# Patient Record
Sex: FEMALE | Race: OTHER | NOT HISPANIC OR LATINO | URBAN - METROPOLITAN AREA
[De-identification: names, ages, dates, MRNs, and addresses within clinical notes are randomized per-mention and may not be internally consistent; named-entity substitution may affect disease eponyms.]

---

## 2019-12-03 ENCOUNTER — EMERGENCY (EMERGENCY)
Facility: HOSPITAL | Age: 24
LOS: 1 days | Discharge: HOME | End: 2019-12-03
Attending: EMERGENCY MEDICINE | Admitting: EMERGENCY MEDICINE
Payer: OTHER MISCELLANEOUS

## 2019-12-03 VITALS
DIASTOLIC BLOOD PRESSURE: 75 MMHG | HEIGHT: 63 IN | WEIGHT: 130.07 LBS | RESPIRATION RATE: 16 BRPM | HEART RATE: 70 BPM | OXYGEN SATURATION: 97 % | TEMPERATURE: 98 F | SYSTOLIC BLOOD PRESSURE: 113 MMHG

## 2019-12-03 PROCEDURE — 12001 RPR S/N/AX/GEN/TRNK 2.5CM/<: CPT

## 2019-12-03 PROCEDURE — 99283 EMERGENCY DEPT VISIT LOW MDM: CPT | Mod: 25

## 2019-12-03 RX ORDER — IBUPROFEN 200 MG
600 TABLET ORAL ONCE
Refills: 0 | Status: COMPLETED | OUTPATIENT
Start: 2019-12-03 | End: 2019-12-03

## 2019-12-03 RX ORDER — TETANUS TOXOID, REDUCED DIPHTHERIA TOXOID AND ACELLULAR PERTUSSIS VACCINE, ADSORBED 5; 2.5; 8; 8; 2.5 [IU]/.5ML; [IU]/.5ML; UG/.5ML; UG/.5ML; UG/.5ML
0.5 SUSPENSION INTRAMUSCULAR ONCE
Refills: 0 | Status: COMPLETED | OUTPATIENT
Start: 2019-12-03 | End: 2019-12-03

## 2019-12-03 RX ADMIN — TETANUS TOXOID, REDUCED DIPHTHERIA TOXOID AND ACELLULAR PERTUSSIS VACCINE, ADSORBED 0.5 MILLILITER(S): 5; 2.5; 8; 8; 2.5 SUSPENSION INTRAMUSCULAR at 21:21

## 2019-12-03 RX ADMIN — Medication 600 MILLIGRAM(S): at 21:21

## 2019-12-03 NOTE — ED ADULT NURSE NOTE - CHPI ED NUR SYMPTOMS NEG
no rectal pain/no fever/no chills/no blood in mucus/no vomiting/no purulent drainage/no redness/no bleeding at site/no pain/no drainage

## 2019-12-03 NOTE — ED PROVIDER NOTE - PHYSICAL EXAMINATION
Physical Exam  GEN: Awake, alert, non-toxic appearing,   EYES: full EOMI,  ENT: External inspection normal, normal voice,   HEAD: atraumatic  NECK: FROM neck, supple, no meningismus, trachea midline, no JVD  RESP: no tachypnea, no hypoxia, no resp distress,  MSK: FROM of R 2nd digit w/ isolation  SKIN: 1.7cm laceration to volar aspect of R 2nd digit, over proximal phalanx, no fb noted, no tendon/bony exposure, cap refill < 2 sec, sensation intact

## 2019-12-03 NOTE — ED PROVIDER NOTE - CLINICAL SUMMARY MEDICAL DECISION MAKING FREE TEXT BOX
Yes will update tdap, low suspicion for fx, no fb noted, full ROM noted, no obvious tendon exposure noted

## 2019-12-03 NOTE — ED ADULT NURSE NOTE - INTEGUMENTARY WDL
Color consistent with ethnicity/race, warm, dry intact, resilient. laceration to right index finger s/p cutting herself on broken glass at work.

## 2019-12-03 NOTE — ED PROVIDER NOTE - OBJECTIVE STATEMENT
24 yof pw right index finger laceration, cut open against glass bottle, unclear last tdap.  no other complaints.

## 2019-12-03 NOTE — ED PROVIDER NOTE - PATIENT PORTAL LINK FT
You can access the FollowMyHealth Patient Portal offered by [facility] by registering at the following website: [email or web address]. By joining [employer]’s FollowMyHealth portal, you will also be able to view your health information using other applications (apps) compatible with our system.

## 2019-12-03 NOTE — ED PROVIDER NOTE - CARE PLAN
Principal Discharge DX:	Finger laceration, initial encounter  Secondary Diagnosis:	Tetanus toxoid vaccination administered at current visit

## 2019-12-03 NOTE — ED PROVIDER NOTE - NSFOLLOWUPINSTRUCTIONS_ED_ALL_ED_FT
Follow up with your primary care doctor  We gave you a tetanus vaccination today.  Please keep a record of it  12/3/2019  Return in 7-10 days for suture removal  Take antibiotics for 7 days  Keep the wound dry and clean for 2 days  Then you can gently clean with soap and water  Apply bacitracin after cleaning  Return immediately for any new or worsening symptoms or any new concerns

## 2019-12-03 NOTE — ED ADULT NURSE NOTE - CHIEF COMPLAINT
The patient is a 24y Female complaining of laceration to right index finger s/p cutting herself with glass at work.
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
## 2019-12-09 DIAGNOSIS — Z23 ENCOUNTER FOR IMMUNIZATION: ICD-10-CM
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
## 2019-12-09 DIAGNOSIS — Z88.0 ALLERGY STATUS TO PENICILLIN: ICD-10-CM

## 2019-12-09 DIAGNOSIS — S61.210A LACERATION WITHOUT FOREIGN BODY OF RIGHT INDEX FINGER WITHOUT DAMAGE TO NAIL, INITIAL ENCOUNTER: ICD-10-CM
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
## 2019-12-13 ENCOUNTER — EMERGENCY (EMERGENCY)
Facility: HOSPITAL | Age: 24
LOS: 1 days | Discharge: HOME | End: 2019-12-13
Attending: EMERGENCY MEDICINE | Admitting: EMERGENCY MEDICINE

## 2019-12-13 VITALS
WEIGHT: 139.99 LBS | RESPIRATION RATE: 18 BRPM | DIASTOLIC BLOOD PRESSURE: 67 MMHG | HEIGHT: 63 IN | SYSTOLIC BLOOD PRESSURE: 124 MMHG | TEMPERATURE: 98 F | HEART RATE: 74 BPM | OXYGEN SATURATION: 98 %

## 2019-12-13 NOTE — ED PROVIDER NOTE - NSFOLLOWUPINSTRUCTIONS_ED_ALL_ED_FT
[facility]    Suture Removal, Care After  This sheet gives you information about how to care for yourself after your procedure. Your health care provider may also give you more specific instructions. If you have problems or questions, contact your health care provider.  What can I expect after the procedure?  After your stitches (sutures) are removed, it is common to have:  Some discomfort and swelling in the area.Slight redness in the area.Follow these instructions at home:  If you have a bandage:     Wash your hands with soap and water before you change your bandage (dressing). If soap and water are not available, use hand .Change your dressing as told by your health care provider. If your dressing becomes wet or dirty, or develops a bad smell, change it as soon as possible.If your dressing sticks to your skin, soak it in warm water to loosen it.Wound care        Check your wound every day for signs of infection. Check for:  More redness, swelling, or pain.Fluid or blood.Warmth.Pus or a bad smell.Wash your hands with soap and water before and after touching your wound.Apply cream or ointment only as directed by your health care provider. If you are using cream or ointment, wash the area with soap and water 2 times a day to remove all the cream or ointment. Rinse off the soap and pat the area dry with a clean towel.If you have skin glue or adhesive strips on your wound, leave these closures in place. They may need to stay in place for 2 weeks or longer. If adhesive strip edges start to loosen and curl up, you may trim the loose edges. Do not remove adhesive strips completely unless your health care provider tells you to do that.Keep the wound area dry and clean. Do not take baths, swim, or use a hot tub until your health care provider approves.Continue to protect the wound from injury.Do not pick at your wound. Picking can cause an infection.When your wound has completely healed, wear sunscreen over it or cover it with clothing when you are outside. New scars get sunburned easily, which can make scarring worse.General instructions     Take over-the-counter and prescription medicines only as told by your health care provider.Keep all follow-up visits as told by your health care provider. This is important.Contact a health care provider if:  You have redness, swelling, or pain around your wound.You have fluid or blood coming from your wound.Your wound feels warm to the touch.You have pus or a bad smell coming from your wound.Your wound opens up.Get help right away if:  You have a fever.You have redness that is spreading from your wound.Summary  After your sutures are removed, it is common to have some discomfort and swelling in the area.Wash your hands with soap and water before you change your bandage (dressing).Keep the wound area dry and clean. Do not take baths, swim, or use a hot tub until your health care provider approves.This information is not intended to replace advice given to you by your health care provider. Make sure you discuss any questions you have with your health care provider.    Document Released: 09/12/2002 Document Revised: 01/23/2018 Document Reviewed: 01/23/2018  [employer] Patient Education © 2019 [employer]

## 2019-12-13 NOTE — ED ADULT TRIAGE NOTE - CHIEF COMPLAINT QUOTE
Pt presents to ED for right hand 2nd digit x5 suture removal. Pt denies any pain or drainage at site. Site appears clean, dry and intact. LMP unknown, IUD in place.

## 2019-12-13 NOTE — ED PROVIDER NOTE - PATIENT PORTAL LINK FT
You can access the FollowMyHealth Patient Portal offered by [facility] by registering at the following website: [email or web address]. By joining [employer]’s FollowMyHealth portal, you will also be able to view your health information using other applications (apps) compatible with our system.

## 2019-12-13 NOTE — ED PROVIDER NOTE - CLINICAL SUMMARY MEDICAL DECISION MAKING FREE TEXT BOX
Well appearing patient with uncomplicated suture removal. No other complaints. 5 stitches removed without complications. Patient walked out prior to discharge papers.
Vaginal mesh/Breast implant

## 2019-12-13 NOTE — ED PROVIDER NOTE - SKIN, MLM
Well appearing wound with 5 stitches in places, no active bleeding, swelling, erythema, or tenderness. Normal hand exam. Skin normal color for race, warm, dry and intact. No evidence of rash.

## 2019-12-13 NOTE — ED PROVIDER NOTE - OBJECTIVE STATEMENT
25 yo F with recent suture placement 10 days ago p/w suture removal. No interval events and denies any fever or changes in neuro/sensory deficits.

## 2019-12-20 DIAGNOSIS — Z48.01 ENCOUNTER FOR CHANGE OR REMOVAL OF SURGICAL WOUND DRESSING: ICD-10-CM
